# Patient Record
Sex: FEMALE | Race: WHITE | NOT HISPANIC OR LATINO | Employment: UNEMPLOYED | URBAN - METROPOLITAN AREA
[De-identification: names, ages, dates, MRNs, and addresses within clinical notes are randomized per-mention and may not be internally consistent; named-entity substitution may affect disease eponyms.]

---

## 2017-01-30 ENCOUNTER — OFFICE VISIT (OUTPATIENT)
Dept: OPHTHALMOLOGY | Facility: CLINIC | Age: 57
End: 2017-01-30
Payer: COMMERCIAL

## 2017-01-30 DIAGNOSIS — R25.3 EYELID TWITCH: Primary | ICD-10-CM

## 2017-01-30 DIAGNOSIS — Z13.5 GLAUCOMA SCREENING: ICD-10-CM

## 2017-01-30 DIAGNOSIS — H04.123 DRY EYES, BILATERAL: ICD-10-CM

## 2017-01-30 DIAGNOSIS — H52.4 PRESBYOPIA: ICD-10-CM

## 2017-01-30 PROCEDURE — 92004 COMPRE OPH EXAM NEW PT 1/>: CPT | Mod: S$GLB,,, | Performed by: OPTOMETRIST

## 2017-01-30 PROCEDURE — 99999 PR PBB SHADOW E&M-NEW PATIENT-LVL II: CPT | Mod: PBBFAC,,, | Performed by: OPTOMETRIST

## 2017-01-30 RX ORDER — RIZATRIPTAN BENZOATE 5 MG/1
5 TABLET ORAL
COMMUNITY

## 2017-01-30 RX ORDER — BUTALBITAL, ACETAMINOPHEN AND CAFFEINE 50; 325; 40 MG/1; MG/1; MG/1
1 TABLET ORAL EVERY 4 HOURS PRN
COMMUNITY

## 2017-01-30 NOTE — MR AVS SNAPSHOT
[facility]  [address] [facility] [address] [place] [address]  Phone: [phone]  Fax: [phone]                  Arlen Clause   2017 2:00 PM   Office Visit    Description:  Female : 1960   Provider:  Paris Jeff OD   Department:  [facility] - Op[facility]           Reason for Visit     Eye Exam     Headache     Eye Pain           Diagnoses this Visit        Comments    Eyelid twitch    -  Primary     Dry eyes, bilateral         Glaucoma screening         Presbyopia                To Do List           Goals (5 Years of Data)     None      Follow-Up and Disposition     Return if symptoms worsen or fail to improve.      [facility]     [facility] Care [facility] -  Assistance  Registered nurses in the [facility] provide clinical advisement, health education, appointment booking, and other advisory services.  Call for this free service at [phone].             Medications           Message regarding Medications     Verify the changes and/or additions to your medication regime listed below are the same as discussed with your clinician today.  If any of these changes or additions are incorrect, please notify your healthcare provider.             Verify that the below list of medications is an accurate representation of the medications you are currently taking.  If none reported, the list may be blank. If incorrect, please contact your healthcare provider. Carry this list with you in case of emergency.           Current Medications     butalbital-acetaminophen-caffeine -40 mg (FIORICET, ESGIC) -40 mg per tablet Take 1 tablet by mouth every 4 (four) hours as needed for Pain.    rizatriptan (MAXALT) 5 MG tablet Take 5 mg by mouth as needed for Migraine.           Clinical Reference Information           Allergies as of 2017     Macrolide Antibiotics    Mupirocin    Shellfish Containing Products    Tetracyclines      Immunizations Administered on  Date of Encounter - 1/30/2017     None      [facility] Sign-Up     Activating your [facility] account is as easy as 1-2-3!     1) Visit [email or web address], select Sign Up Now, enter this activation code and your date of birth, then select Next.  [email or web address]  Expires: 3/16/2017  5:37 PM      2) Create a username and password to use when you visit [facility] in the future and select a security question in case you lose your password and select Next.    3) Enter your e-mail address and click Sign Up!    Additional Information  If you have questions, please e-mail [email or web address] or call [phone] to talk to our [facility] staff. Remember, [facility] is NOT to be used for urgent needs. For medical emergencies, dial 911.

## 2017-01-30 NOTE — PROGRESS NOTES
HPI     Eye Exam    Additional comments: new pt           Headache    Additional comments: migraines           Eye Pain    Additional comments: twitching OD>OS x1 month. pain behind eye           Comments   Pt states that she has been here before, unable to locate a record. C/o   twitching OD>OS, began precious day. Experienced some pain behind her   eye, but has gone away. Pt has Lumigan samples, has been using for eyelash   growth, wants to make sure that it is safe for use. Using +1.50 otc   readers. Has migraines, started a new med recently.   Currently using Maxalt and Fiorcet, without much success.  Uses artificial tears as needed..       Last edited by Paris Jeff, OD on 1/30/2017  4:19 PM. (History)            Assessment /Plan     For exam results, see Encounter Report.    Eyelid twitch    Dry eyes, bilateral    Glaucoma screening    Presbyopia      Benign lid twitch.    Continue artificial tears as needed..  Glaucoma screening and fundus exam normal.  Continue over the counter readers.  Return to clinic 1 yr.

## 2017-09-15 ENCOUNTER — TELEPHONE (OUTPATIENT)
Dept: OPHTHALMOLOGY | Facility: CLINIC | Age: 57
End: 2017-09-15

## 2017-09-15 ENCOUNTER — OFFICE VISIT (OUTPATIENT)
Dept: OPHTHALMOLOGY | Facility: CLINIC | Age: 57
End: 2017-09-15
Payer: COMMERCIAL

## 2017-09-15 DIAGNOSIS — H01.025 SQUAMOUS BLEPHARITIS OF LEFT LOWER EYELID: Primary | ICD-10-CM

## 2017-09-15 PROCEDURE — 99999 PR PBB SHADOW E&M-EST. PATIENT-LVL I: CPT | Mod: PBBFAC,,, | Performed by: OPTOMETRIST

## 2017-09-15 PROCEDURE — 92012 INTRM OPH EXAM EST PATIENT: CPT | Mod: S$GLB,,, | Performed by: OPTOMETRIST

## 2017-09-15 RX ORDER — BACITRACIN ZINC AND POLYMYXIN B SULFATE 500; 10000 [USP'U]/G; [USP'U]/G
OINTMENT OPHTHALMIC
Qty: 3.5 G | Refills: 0 | Status: SHIPPED | OUTPATIENT
Start: 2017-09-15 | End: 2017-09-22

## 2017-09-15 NOTE — PROGRESS NOTES
HPI     Eye Problem    Additional comments: Swollen bottom lid OD           Comments   NP to [facility]. Patient c/o swollen bottom lid OS  Pain Scale:  6  Onset:   4 days ago  OD, OS, OU:   OS*  Discharge:   No  A.M. Matting:  No  Itch:   No  Redness:   No  Photophobia:   No  Foreign body sensation:   Yes  Deep pain:   No  Previous occurrence:   No  Drops:   Refresh       Last edited by Pau Watson, PCT on 9/15/2017 10:19 AM. (History)              Assessment /Plan     For exam results, see Encounter Report.    Squamous blepharitis of left lower eyelid    -     tobramycin sulfate 0.3% (TOBREX) 0.3 % ophthalmic ointment; Apply small amount to affected area of left lower lid tid  Dispense: 3.5 g; Refill: 0  Reassured pt not pink eye  Recommended warm soaks tid OS x 1 wk  Tobramycin da as directed    RTC PRN if symptoms worsen or if not resolved x 1 week  Discussed above and all questions were answered.

## 2017-09-15 NOTE — TELEPHONE ENCOUNTER
----- Message from ELIOT Grover sent at 9/15/2017  1:22 PM CDT -----  Contact: pt      ----- Message -----  From: Aimee Green  Sent: 9/15/2017  11:47 AM  To: Jennifer Cruz Staff    [phone] pt states tobramycin sulfate 0.3% (TOBREX) 0.3 % ophthalmic ointment is too high in cost she would like another oint called in thanks

## 2017-09-18 ENCOUNTER — OFFICE VISIT (OUTPATIENT)
Dept: OPHTHALMOLOGY | Facility: CLINIC | Age: 57
End: 2017-09-18
Payer: COMMERCIAL

## 2017-09-18 DIAGNOSIS — H00.015 HORDEOLUM EXTERNUM OF LEFT LOWER EYELID: Primary | ICD-10-CM

## 2017-09-18 PROCEDURE — 92012 INTRM OPH EXAM EST PATIENT: CPT | Mod: S$GLB,,, | Performed by: OPTOMETRIST

## 2017-09-18 PROCEDURE — 99999 PR PBB SHADOW E&M-EST. PATIENT-LVL II: CPT | Mod: PBBFAC,,, | Performed by: OPTOMETRIST

## 2017-09-18 NOTE — PROGRESS NOTES
HPI     Last DNL visit 09/15/2017  Chief complaint: Irritation lower eyelid left eye  Onset: about 6 days ago  Patient was seen by DNL on Friday and of given Tobrex ointment and states   that condition is not improving and just wants to make sure it is not   getting worse    Last edited by Zacarias Morris MA on 9/18/2017  8:30 AM. (History)            Assessment /Plan     For exam results, see Encounter Report.    Hordeolum externum of left lower eyelid      Continue Tobrex.  Add frequent  hot compresses.  Return to clinic as needed..

## 2017-09-25 ENCOUNTER — TELEPHONE (OUTPATIENT)
Dept: OPHTHALMOLOGY | Facility: CLINIC | Age: 57
End: 2017-09-25

## 2017-09-25 NOTE — TELEPHONE ENCOUNTER
Called and spoke with patient advised to use warm compresses and if condition does not improve to please give us a call

## 2017-09-25 NOTE — TELEPHONE ENCOUNTER
----- Message from Jessica Green sent at 9/25/2017  9:11 AM CDT -----  Contact: pt  The pt has a question concerning her eye, can be reached at [phone]///[id number]

## 2021-01-25 ENCOUNTER — TELEPHONE (OUTPATIENT)
Dept: ALLERGY | Facility: CLINIC | Age: 61
End: 2021-01-25

## 2021-05-17 ENCOUNTER — TELEPHONE (OUTPATIENT)
Dept: ALLERGY | Facility: CLINIC | Age: 61
End: 2021-05-17

## 2021-05-20 ENCOUNTER — OFFICE VISIT (OUTPATIENT)
Dept: ALLERGY | Facility: CLINIC | Age: 61
End: 2021-05-20
Payer: COMMERCIAL

## 2021-05-20 VITALS
SYSTOLIC BLOOD PRESSURE: 112 MMHG | DIASTOLIC BLOOD PRESSURE: 77 MMHG | WEIGHT: 125 LBS | TEMPERATURE: 97 F | BODY MASS INDEX: 22.15 KG/M2 | HEIGHT: 63 IN | HEART RATE: 73 BPM

## 2021-05-20 DIAGNOSIS — L50.9 URTICARIA: Primary | ICD-10-CM

## 2021-05-20 DIAGNOSIS — G43.909 MIGRAINE WITHOUT STATUS MIGRAINOSUS, NOT INTRACTABLE, UNSPECIFIED MIGRAINE TYPE: ICD-10-CM

## 2021-05-20 DIAGNOSIS — T61.91XD: ICD-10-CM

## 2021-05-20 DIAGNOSIS — T61.781D: ICD-10-CM

## 2021-05-20 PROBLEM — T61.781A: Status: ACTIVE | Noted: 2021-05-20

## 2021-05-20 PROBLEM — T61.91XA: Status: ACTIVE | Noted: 2021-05-20

## 2021-05-20 PROCEDURE — 99214 PR OFFICE/OUTPT VISIT, EST, LEVL IV, 30-39 MIN: ICD-10-PCS | Mod: S$GLB,,, | Performed by: SPECIALIST

## 2021-05-20 PROCEDURE — 3008F PR BODY MASS INDEX (BMI) DOCUMENTED: ICD-10-PCS | Mod: CPTII,S$GLB,, | Performed by: SPECIALIST

## 2021-05-20 PROCEDURE — 99999 PR PBB SHADOW E&M-EST. PATIENT-LVL III: CPT | Mod: PBBFAC,,, | Performed by: SPECIALIST

## 2021-05-20 PROCEDURE — 99214 OFFICE O/P EST MOD 30 MIN: CPT | Mod: S$GLB,,, | Performed by: SPECIALIST

## 2021-05-20 PROCEDURE — 3008F BODY MASS INDEX DOCD: CPT | Mod: CPTII,S$GLB,, | Performed by: SPECIALIST

## 2021-05-20 PROCEDURE — 99999 PR PBB SHADOW E&M-EST. PATIENT-LVL III: ICD-10-PCS | Mod: PBBFAC,,, | Performed by: SPECIALIST

## 2021-05-20 RX ORDER — ONABOTULINUMTOXINA 100 [USP'U]/1
INJECTION, POWDER, LYOPHILIZED, FOR SOLUTION INTRADERMAL; INTRAMUSCULAR
COMMUNITY

## 2021-05-20 RX ORDER — EPINEPHRINE 0.3 MG/.3ML
1 INJECTION SUBCUTANEOUS ONCE
Qty: 4 DEVICE | Refills: 2 | Status: SHIPPED | OUTPATIENT
Start: 2021-05-20 | End: 2021-05-20

## 2021-05-20 RX ORDER — ALPRAZOLAM 0.25 MG/1
TABLET ORAL
COMMUNITY
Start: 2017-06-28

## 2021-05-20 RX ORDER — PREDNISONE 20 MG/1
20 TABLET ORAL DAILY PRN
Qty: 20 TABLET | Refills: 0 | Status: SHIPPED | OUTPATIENT
Start: 2021-05-20 | End: 2021-05-25

## 2021-05-20 RX ORDER — ATORVASTATIN CALCIUM 10 MG/1
10 TABLET, FILM COATED ORAL
COMMUNITY
Start: 2021-03-22 | End: 2024-02-15

## 2021-05-20 RX ORDER — FLUOXETINE 10 MG/1
10 TABLET ORAL
COMMUNITY
Start: 2017-09-15

## 2022-02-15 NOTE — PROGRESS NOTES
Subjective:       Patient ID: Arlen Baker is a 61 y.o. female.    Chief Complaint:    Follow up on Dermatographic and allergic urticaria and food/ shell fish allergy-- history.      HPI:      Had COVID in January 24- 29 th. Was RX with antibody cocktail-- relieved uneventfully.  Not yet eaten Clam- EATS ALL OTHER SHELL FISH.    OM Q 3 monts BOTOX injection for MIGRAINE- Helping a lot. Not had eye check up in years.  Last seen on 05- 20- 2021-- with acute generalized urticaria. The hives were treated with Benadryl, prednisone, Xyzal and Singulair..  She is very dermatographic. Because of that she can get spontaneous hives with minimal provocation.    There is a history of having large local reactions after insect and bug bites.    She had a history of allergies / anaphylaxis after eating shell fish. I did skin prick tests and Ig E RAST and orally challenged with all shell fish. She was not willing to be challenged with clam and is still not eating oil.    Has hyper cholesterolemia. Has a long standing history of migraine.    There is a history of allergies to common inhalant aero allergens.    Mupirocin caused itch, redness and swelling.  Drug allergies include macrolides and tetracyclines.  Has a family history of allergies.             Macrolide antibiotics, Mupirocin, Shellfish containing products, and Tetracyclines     Past Medical History:   Diagnosis Date    Allergy     Migraines        No family history on file.    Environmental History: Dust Mite Controls: Dust mite controls are already in place.     Review of Systems   Constitutional: Negative.  Negative for fatigue and fever.   HENT: Positive for congestion, postnasal drip and rhinorrhea. Negative for ear pain, sinus pressure, sinus pain, sneezing and sore throat.    Eyes: Negative.  Negative for redness and itching.   Respiratory: Negative.  Negative for cough, chest tightness, shortness of breath and wheezing.    Cardiovascular: Negative.  Negative  for chest pain.   Gastrointestinal: Negative.  Negative for nausea.   Endocrine: Negative.  Negative for cold intolerance.   Genitourinary: Negative.  Negative for frequency.   Musculoskeletal: Negative.  Negative for myalgias.   Skin: Negative.  Negative for rash.   Allergic/Immunologic: Positive for environmental allergies and food allergies. Negative for immunocompromised state.   Neurological: Positive for headaches. Negative for dizziness.   Hematological: Negative.  Negative for adenopathy.   Psychiatric/Behavioral: Negative.  Negative for sleep disturbance.       Objective:     There were no vitals taken for this visit.    Physical Exam  Vitals and nursing note reviewed.   Constitutional:       Appearance: Normal appearance. She is normal weight.   HENT:      Head: Normocephalic and atraumatic.      Right Ear: Tympanic membrane, ear canal and external ear normal.      Left Ear: Tympanic membrane, ear canal and external ear normal.      Nose: Congestion and rhinorrhea present.      Mouth/Throat:      Mouth: Mucous membranes are moist.      Pharynx: Oropharynx is clear.   Eyes:      Extraocular Movements: Extraocular movements intact.      Conjunctiva/sclera: Conjunctivae normal.      Pupils: Pupils are equal, round, and reactive to light.   Cardiovascular:      Rate and Rhythm: Normal rate and regular rhythm.      Pulses: Normal pulses.      Heart sounds: Normal heart sounds.   Pulmonary:      Effort: Pulmonary effort is normal.   Abdominal:      General: Abdomen is flat. Bowel sounds are normal.      Palpations: Abdomen is soft.   Musculoskeletal:         General: Normal range of motion.      Cervical back: Normal range of motion and neck supple.   Skin:     General: Skin is warm and dry.      Capillary Refill: Capillary refill takes less than 2 seconds.   Neurological:      General: No focal deficit present.      Mental Status: She is alert and oriented to person, place, and time. Mental status is at  baseline.   Psychiatric:         Mood and Affect: Mood normal.         Behavior: Behavior normal.         Thought Content: Thought content normal.         Judgment: Judgment normal.           Assessment:      1. Urticaria due to food allergy    2. Dermatographic urticaria    3. Migraine without status migrainosus, not intractable, unspecified migraine type    4       Shell fish allergy  5       Drug allergy-- Macrolides, tetracycline and Mupirocin.  6        Contact dermatitis to Bactroban  7        Recovered from COVID-- Jan 24- 29 th 2022..    Plan:       Was skin tested, confirmatory IgE Immuno CAP done and underwent oral provocation challenge with various shell fish on different days,  On separate days-- successfully.  Since clam was the only shell fish she was not orally challenged with, I encourage her tp do it.  Xyzal 5 mg qd.  Singulair 10 mg qd , if hives recur  For allergic emergency-- Epipen or AUVI- Q 0.30 mg IM stat  BOTOX INJECTIONS Q 3 MONTHS  Identify and avoid migraine triggers  Ophthalmology referral with Timothy Andrade MD RECOMMENDED.  Recommend oral provocation challenge with CLAM CHOWDER  Avoid scratching and inducing trauma to the skin  Maxalt prn for migraine  Lipitor 40 mg qd  Follow up in 10 months                  Problems Address                                                 Amount and/or Complexity                                                                      Risk       3           [] 2 or more self-limited or minor problems                      [] Limited                                                                        [] Low                  [] 1 stable chronic illness                                                  Any combination of the two                                               OTC drugs                  []Acute, uncomplicated illness or injury                            Review of prior external notes from unique source           Minor surgery with no risk  factors                                                                                                               [] 1 []2  []3+                                                                                                              Review of results from each unique test                                                                                                               [] 1 []2  [] 3+                                                                                                              Order of each unique test                                                                                                               [] 1 []2  [] 3+                                                                                                              Or                                                                                                             [] Assessment requiring an independent historian      4            [x] One or more chronic illness with exacerbation,              [x] Moderate                                                                      [x] Moderate                 Progression, or side effects of treatment                            -test documents or independent historians                        Prescription drug management                [x]  2 or more sable chronic illnesses                                    [] Independent interpretation of tests                              Minor surgery with identifiable risk                [] 1 undiagnosed new problem with uncertain prognosis    [] Discussion or management of test results                    elective major surgery                [] 1 acute illness with                systemic symptoms                                                                                                                                                              [] 1 acute complicated injury                                                                                                                                           Elective major surgery                                                                                                                                                                                                                                                                                                                                                                                                  5            [] 1 or more chronic illnesses with severe exacerbation,     [] Extensive(two from below)                                         [] High                                                                                                               [] Independent interpretation of results                         Drug therapy requiring intensive                                                                                                               []Discussion of management or test interpretation           monitoring                                                                                                                                                                                                       Decision to de-escalate care                 [] 1 acute or chronic illness or injury that poses a threat                                                                                               Decision regarding hospitalization

## 2022-02-16 ENCOUNTER — OFFICE VISIT (OUTPATIENT)
Dept: ALLERGY | Facility: CLINIC | Age: 62
End: 2022-02-16
Payer: COMMERCIAL

## 2022-02-16 VITALS
WEIGHT: 141.56 LBS | HEIGHT: 63 IN | HEART RATE: 76 BPM | TEMPERATURE: 96 F | BODY MASS INDEX: 25.08 KG/M2 | DIASTOLIC BLOOD PRESSURE: 78 MMHG | SYSTOLIC BLOOD PRESSURE: 125 MMHG

## 2022-02-16 DIAGNOSIS — L50.0 URTICARIA DUE TO FOOD ALLERGY: Primary | ICD-10-CM

## 2022-02-16 DIAGNOSIS — L50.3 DERMATOGRAPHIC URTICARIA: ICD-10-CM

## 2022-02-16 DIAGNOSIS — G43.909 MIGRAINE WITHOUT STATUS MIGRAINOSUS, NOT INTRACTABLE, UNSPECIFIED MIGRAINE TYPE: ICD-10-CM

## 2022-02-16 PROCEDURE — 99214 OFFICE O/P EST MOD 30 MIN: CPT | Mod: S$GLB,,, | Performed by: SPECIALIST

## 2022-02-16 PROCEDURE — 3078F PR MOST RECENT DIASTOLIC BLOOD PRESSURE < 80 MM HG: ICD-10-PCS | Mod: CPTII,S$GLB,, | Performed by: SPECIALIST

## 2022-02-16 PROCEDURE — 99214 PR OFFICE/OUTPT VISIT, EST, LEVL IV, 30-39 MIN: ICD-10-PCS | Mod: S$GLB,,, | Performed by: SPECIALIST

## 2022-02-16 PROCEDURE — 1160F PR REVIEW ALL MEDS BY PRESCRIBER/CLIN PHARMACIST DOCUMENTED: ICD-10-PCS | Mod: CPTII,S$GLB,, | Performed by: SPECIALIST

## 2022-02-16 PROCEDURE — 3074F SYST BP LT 130 MM HG: CPT | Mod: CPTII,S$GLB,, | Performed by: SPECIALIST

## 2022-02-16 PROCEDURE — 3008F BODY MASS INDEX DOCD: CPT | Mod: CPTII,S$GLB,, | Performed by: SPECIALIST

## 2022-02-16 PROCEDURE — 1159F MED LIST DOCD IN RCRD: CPT | Mod: CPTII,S$GLB,, | Performed by: SPECIALIST

## 2022-02-16 PROCEDURE — 3008F PR BODY MASS INDEX (BMI) DOCUMENTED: ICD-10-PCS | Mod: CPTII,S$GLB,, | Performed by: SPECIALIST

## 2022-02-16 PROCEDURE — 3074F PR MOST RECENT SYSTOLIC BLOOD PRESSURE < 130 MM HG: ICD-10-PCS | Mod: CPTII,S$GLB,, | Performed by: SPECIALIST

## 2022-02-16 PROCEDURE — 1160F RVW MEDS BY RX/DR IN RCRD: CPT | Mod: CPTII,S$GLB,, | Performed by: SPECIALIST

## 2022-02-16 PROCEDURE — 99999 PR PBB SHADOW E&M-EST. PATIENT-LVL III: CPT | Mod: PBBFAC,,, | Performed by: SPECIALIST

## 2022-02-16 PROCEDURE — 99999 PR PBB SHADOW E&M-EST. PATIENT-LVL III: ICD-10-PCS | Mod: PBBFAC,,, | Performed by: SPECIALIST

## 2022-02-16 PROCEDURE — 1159F PR MEDICATION LIST DOCUMENTED IN MEDICAL RECORD: ICD-10-PCS | Mod: CPTII,S$GLB,, | Performed by: SPECIALIST

## 2022-02-16 PROCEDURE — 3078F DIAST BP <80 MM HG: CPT | Mod: CPTII,S$GLB,, | Performed by: SPECIALIST

## 2022-09-21 ENCOUNTER — TELEPHONE (OUTPATIENT)
Dept: ALLERGY | Facility: CLINIC | Age: 62
End: 2022-09-21
Payer: COMMERCIAL

## 2022-09-21 DIAGNOSIS — L50.0 URTICARIA DUE TO FOOD ALLERGY: Primary | ICD-10-CM

## 2022-09-21 RX ORDER — EPINEPHRINE 0.3 MG/.3ML
1 INJECTION SUBCUTANEOUS ONCE
Qty: 2 EACH | Refills: 2 | Status: SHIPPED | OUTPATIENT
Start: 2022-09-21 | End: 2024-02-15

## 2022-09-21 NOTE — TELEPHONE ENCOUNTER
----- Message from Gabby Pope LPN sent at 9/16/2022  2:22 PM CDT -----  Spoke with patient, she is requesting 2 boxes of epi pen to be sent to pharmacy please.  ----- Message -----  From: Joya Alvarado  Sent: 9/16/2022  10:52 AM CDT  To: Vianney Rucker Staff    Pt called in re: Epi pen please call pt @ .[phone]        Thanks [facility]

## 2022-11-07 PROBLEM — M81.0 OSTEOPOROSIS, POST-MENOPAUSAL: Status: ACTIVE | Noted: 2019-05-08

## 2022-11-07 PROBLEM — I34.1 MVP (MITRAL VALVE PROLAPSE): Status: ACTIVE | Noted: 2022-11-07

## 2024-01-25 ENCOUNTER — TELEPHONE (OUTPATIENT)
Dept: ALLERGY | Facility: CLINIC | Age: 64
End: 2024-01-25
Payer: COMMERCIAL

## 2024-01-25 NOTE — TELEPHONE ENCOUNTER
----- Message from Paige Rivera sent at 1/25/2024  4:31 PM CST -----  Contact: Arlen Mckinley is calling to speak to the nurse regarding scheduling a appointment for her epi pen renewal, please give her a call at [phone]    Thanks  LJ

## 2024-02-29 ENCOUNTER — OFFICE VISIT (OUTPATIENT)
Dept: ALLERGY | Facility: CLINIC | Age: 64
End: 2024-02-29
Payer: MEDICARE

## 2024-02-29 VITALS
TEMPERATURE: 98 F | HEART RATE: 86 BPM | BODY MASS INDEX: 24.76 KG/M2 | WEIGHT: 139.75 LBS | SYSTOLIC BLOOD PRESSURE: 132 MMHG | DIASTOLIC BLOOD PRESSURE: 83 MMHG

## 2024-02-29 DIAGNOSIS — L50.0 URTICARIA DUE TO FOOD ALLERGY: Primary | ICD-10-CM

## 2024-02-29 DIAGNOSIS — L50.3 DERMATOGRAPHIC URTICARIA: ICD-10-CM

## 2024-02-29 DIAGNOSIS — Z88.9 MULTIPLE DRUG ALLERGIES: ICD-10-CM

## 2024-02-29 DIAGNOSIS — G43.109 MIGRAINE WITH AURA AND WITHOUT STATUS MIGRAINOSUS, NOT INTRACTABLE: ICD-10-CM

## 2024-02-29 PROCEDURE — 99213 OFFICE O/P EST LOW 20 MIN: CPT | Mod: PBBFAC | Performed by: SPECIALIST

## 2024-02-29 PROCEDURE — 99999 PR PBB SHADOW E&M-EST. PATIENT-LVL III: CPT | Mod: PBBFAC,,, | Performed by: SPECIALIST

## 2024-02-29 PROCEDURE — 99417 PROLNG OP E/M EACH 15 MIN: CPT | Mod: S$PBB,,, | Performed by: SPECIALIST

## 2024-02-29 PROCEDURE — 99215 OFFICE O/P EST HI 40 MIN: CPT | Mod: S$PBB,,, | Performed by: SPECIALIST

## 2024-02-29 RX ORDER — EPINEPHRINE 0.3 MG/.3ML
1 INJECTION SUBCUTANEOUS ONCE
Qty: 4 EACH | Refills: 1 | Status: SHIPPED | OUTPATIENT
Start: 2024-02-29 | End: 2024-02-29

## 2024-02-29 NOTE — PROGRESS NOTES
Subjective:       Patient ID: Larry Baker is a 63 y.o. female.    Chief Complaint:    FOLLOW UP ON DERMATOGRAPHIC URTICARIA, ALLERGIC URTICARIA-- No longer has shell fish allergy    HPI:   female, 63 year old,  with the above complaints. DOING WELL OVERALL. HAS YEAR ROUND NASAL ALLERGIES.   NO longer has shellfish allergies. She eats all shellfish without any symptoms.  Can not tolerate alcohol- even one drink of Daiquri or wine.  LARRY IS EXTREMELY DERMOGRAPHIC. sHE WAS ADVISED TO AVOID SCRATCHING AND INDUCING TRAUMA TO THE SKIN.  SHE GETS LARGE LOCAL REACTIONS AFTER INSECT BITES.  SHE HAS NOT HAD ANY EPISODES OF ANAPHYLAXIS IN THE LAST 5 YEARS-- However she wants to have Epipen available , just in case.  Remote allergy testing revealed allergies to common aero allergens.  mUPIROCIN CAUSED NASAL ITCH AND REDNESS AND SWELLING.  SHE CAN NOT TAKE MACROLIDES AND TETRACYCLINES.  SHE HAS A LONG STANDING HISTORY OF MIGRAINES.  I HAVE ADVISED her to consult ENT Dr. Orville Rene for ears pressure.  Must follow up with her cardiologist for tachycardia and  heart rhythm issues- May need Holter- Must rule out A fib.         Macrolide antibiotics, Mupirocin, Shellfish containing products, and Tetracyclines -- allergies were reported    Past Medical History:   Diagnosis Date    Allergy     Migraines     MVP (mitral valve prolapse)     Osteoporosis        Family History   Problem Relation Age of Onset    Diabetes Mother     Diabetes Maternal Grandmother        Environmental History: Dust Mite Controls: Dust mite controls are not in place.     Review of Systems   Constitutional: Negative.    HENT:  Positive for congestion and postnasal drip.    Eyes: Negative.    Respiratory: Negative.     Cardiovascular: Negative.    Gastrointestinal: Negative.    Endocrine: Negative.    Genitourinary: Negative.    Musculoskeletal: Negative.    Skin: Negative.    Allergic/Immunologic: Negative.    Neurological: Negative.   "  Hematological: Negative.    Psychiatric/Behavioral: Negative.       Objective:     There were no vitals taken for this visit.    Physical Exam  Vitals and nursing note reviewed. Exam conducted with a chaperone present.   Constitutional:       Appearance: Normal appearance. She is normal weight.   HENT:      Head: Normocephalic and atraumatic.      Right Ear: Tympanic membrane, ear canal and external ear normal.      Left Ear: Tympanic membrane, ear canal and external ear normal.      Nose: Congestion present.      Mouth/Throat:      Mouth: Mucous membranes are moist.      Pharynx: Oropharynx is clear.   Eyes:      Extraocular Movements: Extraocular movements intact.      Conjunctiva/sclera: Conjunctivae normal.      Pupils: Pupils are equal, round, and reactive to light.   Cardiovascular:      Rate and Rhythm: Regular rhythm. Tachycardia present.   Pulmonary:      Effort: Pulmonary effort is normal.      Breath sounds: Normal breath sounds.   Abdominal:      General: Abdomen is flat. Bowel sounds are normal.      Palpations: Abdomen is soft.   Musculoskeletal:         General: Normal range of motion.      Cervical back: Normal range of motion and neck supple.   Skin:     General: Skin is warm and dry.      Capillary Refill: Capillary refill takes less than 2 seconds.   Neurological:      General: No focal deficit present.      Mental Status: She is alert and oriented to person, place, and time. Mental status is at baseline.   Psychiatric:         Mood and Affect: Mood normal.         Behavior: Behavior normal.         Thought Content: Thought content normal.         Judgment: Judgment normal.         Assessment:      1. Urticaria due to food allergy    2. Dermatographic urticaria    3. Migraine with aura and without status migrainosus, not intractable    4. Multiple drug allergies    5       Contact dermatitis  to mupirocin  6        " Allergy to macrolides, tetracycline and mupirocin '  7        Pulsatle " bilateral ears     Plan:     SUCCESSFULLY DID ORAL PROVOCATION CHALLENGE WITH  SHELLFISH.  ----------------------------------------------------------------------------------------------------------------  As needed Epipen 1: 1000-- 0.30 mg IM stat.    XYZAL 5 mg qd PRN  Singulair 10 mg.PRN  Botox injections for migraine.  Maxalt prn for acute episodes of migraine..  Lipitor 40 mg qd Avoid scratch and trauma to the skin- lukewarm showers or bath-- dermographic,\.  CARDIOLOGY CONSULT-- Louis joyce MD- may need EKG and Holter monitoring- would like to rule out A fib.  Consult ENT Orville Francis MD about throbbing ears.    Yearly follow ups                  Problems Address                                                 Amount and/or Complexity                                                                      Risk       3           [] 2 or more self-limited or minor problems                      [] Limited                                                                        [] Low                  [] 1 stable chronic illness                                                  Any combination of the two                                               OTC drugs                  []Acute, uncomplicated illness or injury                            Review of prior external notes from unique source           Minor surgery with no risk factors                                                                                                               [] 1 []2  []3+                                                                                                              Review of results from each unique test                                                                                                               [] 1 []2  [] 3+                                                                                                              Order of each unique test                                                                                                                [] 1 []2  [] 3+                                                                                                              Or                                                                                                             [] Assessment requiring an independent historian      4            [] One or more chronic illness with exacerbation,              [] Moderate                                                                      [] Moderate                 Progression, or side effects of treatment                            -test documents or independent historians                        Prescription drug management                []  2 or more sable chronic illnesses                                    [] Independent interpretation of tests                              Minor surgery with identifiable risk                [] 1 undiagnosed new problem with uncertain prognosis    [] Discussion or management of test results                    elective major surgery                [] 1 acute illness with                systemic symptoms                                                                                                                                                              [] 1 acute complicated injury                                                                                                                                          Elective major surgery                                                                                                                                                                                                                                                                                                                                                                                                  5            [] 1 or more chronic illnesses with severe exacerbation,     [] Extensive(two from  below)                                         [] High                                                                                                               [] Independent interpretation of results                         Drug therapy requiring intensive                                                                                                               []Discussion of management or test interpretation           monitoring                                                                                                                                                                                                       Decision to de-escalate care                 [] 1 acute or chronic illness or injury that poses a threat                                                                                               Decision regarding hospitalization

## 2024-08-14 ENCOUNTER — TELEPHONE (OUTPATIENT)
Dept: ALLERGY | Facility: CLINIC | Age: 64
End: 2024-08-14
Payer: COMMERCIAL

## 2024-08-14 NOTE — TELEPHONE ENCOUNTER
Rash since Friday (not hives per pt). Started new medication for Graves Disease two weeks ago (Methimazole). Rash is generalized, no angioedema, very itchy. Went to Urgent Care on Sat where she received steroid injection and Hydroxyzine tid. Also taking Claritin daily. Currently at Dr Michaels's office who recommended she follow up with Dr Faith.  Per Dr Faith, ok to work in for tomorrow morning.

## 2024-08-14 NOTE — PROGRESS NOTES
Subjective:       Patient ID: Arlen Baker is a 63 y.o. female.    Chief Complaint:  '  New onset skin rash after being on thyroid medication.  Has been having Dermatographic Urticaria.    HPI:     FEMALE 63 YEAR OLD, accompanied by her .  New complaint is generalized urticaria --( without angioedema )  since 08- 13- 2024. The patient is concerned that the hives may be due to Methimazole 10 mg she  Was started on 07- 28- 2024- to treat hyperthyroidism by her endocrinologist Suresh walker MD .. Hives last 24 -36 hours. Five days ago she was seen at an urgent care center for urticaria- and was treated with a Celestone injection, Vistaril 25 mg tid . She took one 20 mg prednisone that made her anxious and caused palpitation  The hives are prersistant even after discontinuing Methimazole.  --------------------------------------------------------------------------------------------  She has MODERATE DERMATOGRAPHIA OVER THE PAST 5 plus years  --------------------------------------------------------------    She has an appointment to follow up with Elias Moreno MD , Dermatologist today. He has been treating her with Fluconazole one 200 mg pill weekly in the past several months for feet fungal infection. I DOUBT THE INFECTION OR ANTIFUNGAL TREATMENT are responsible for urticaria.  ---------------------------------------------------------------------------------------------------------------------------------------------------------------------------------------------------------  The patient , in the past had hives  / angioedema - suspected to have caused after eating shell fish. I had tested her by Immun[facility] to shell fish, which were negative. I orally challenged her with various shellfish to establish that she is not allergic to shell fish.  ----------------------------------------------------------------------------------------------------------------------------  She is an anxious person. Also  worried about her elderly father with medical issues.    Never vaped or smoked cigarettes. 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       Macrolide antibiotics, Mupirocin, Shellfish containing products, and Tetracyclines - allergies were reported    Past Medical History:   Diagnosis Date    Allergy     Migraines     MVP (mitral valve prolapse)     Osteoporosis        Family History   Problem Relation Name Age of Onset    Diabetes Mother      Diabetes Maternal Grandmother         Environmental History: Dust Mite Controls: Dust mite controls are already in place.     Review of Systems   Constitutional: Negative.  Negative for fatigue and fever.   HENT: Negative.  Negative for congestion, ear pain, postnasal drip, rhinorrhea, sinus pressure, sinus pain, sneezing and sore throat.    Eyes: Negative.  Negative for redness and itching.   Respiratory: Negative.  Negative for cough, chest tightness, shortness of breath and wheezing.    Cardiovascular: Negative.  Negative for chest pain.   Gastrointestinal: Negative.  Negative for nausea.   Endocrine: Negative.  Negative for cold intolerance.   Genitourinary: Negative.  Negative for frequency.   Musculoskeletal: Negative.  Negative for myalgias.   Skin:  Negative for rash.        Urticaria - generalized since 08- 03- 2024. Moderate dermatographia   Allergic/Immunologic: Negative.  Negative for environmental allergies, food allergies and immunocompromised state.   Neurological: Negative.  Negative for dizziness and headaches.   Hematological: Negative.  Negative for adenopathy.   Psychiatric/Behavioral: Negative.  Negative for sleep disturbance.      Objective:     There were no vitals taken for this visit.    Physical Exam  Vitals and nursing note reviewed. Exam conducted with a chaperone present.   Constitutional:       Appearance: Normal appearance. She is normal weight.   HENT:      Head: Normocephalic and atraumatic.      Right Ear: Tympanic membrane, ear canal and external  "ear normal.      Left Ear: Tympanic membrane, ear canal and external ear normal.      Nose: Congestion present.      Mouth/Throat:      Mouth: Mucous membranes are moist.      Pharynx: Oropharynx is clear.   Eyes:      Extraocular Movements: Extraocular movements intact.      Conjunctiva/sclera: Conjunctivae normal.      Pupils: Pupils are equal, round, and reactive to light.   Cardiovascular:      Rate and Rhythm: Normal rate and regular rhythm.      Pulses: Normal pulses.      Heart sounds: Normal heart sounds.   Pulmonary:      Effort: Pulmonary effort is normal.      Breath sounds: Normal breath sounds.   Abdominal:      General: Abdomen is flat. Bowel sounds are normal.      Palpations: Abdomen is soft.   Musculoskeletal:         General: Normal range of motion.      Cervical back: Normal range of motion and neck supple.   Skin:     General: Skin is warm and dry.      Capillary Refill: Capillary refill takes less than 2 seconds.      Comments: Urticaria- - left cubital fossa, trunl, bilateral thigs, legs and anterior abdominal wall- red, raised and itchy.   Neurological:      General: No focal deficit present.      Mental Status: She is alert and oriented to person, place, and time. Mental status is at baseline.   Psychiatric:         Mood and Affect: Mood normal.         Behavior: Behavior normal.         Thought Content: Thought content normal.         Judgment: Judgment normal.       Assessment:      1. Dermatographic urticaria    2. Dry skin dermatitis    3. Urticaria due to food allergy    4. Migraine without status migrainosus, not intractable, unspecified migraine type    5. Multiple drug allergies    6. Pulsatile tinnitus of both ears    7      MUPIROCIN- CONTACT DERMATITIS  8      " Allergy to Macrolides, tetracyclines and mupirocin "  9       ACUTE GENERALIZED URTICARIA  SINCE 08- 13- 2024.      Plan:     START BACK ON METHIMAZOLE 10 MG.- Follow up with Suresh Michaels MD, " Endocrinologist  ------------------------------------------------------------  Allegra 180 mg - 2 in the morning.  Zyrtec 10 mg-- 2 tablets  PM or Vistaril 50 mg at nights.  Singulair 10 mg  If urticaria worsens- consider starting her on XOLAIR 300 mg q 4 weeks,  Maxalt prn for acute episodes of migraine.  Botox injections q 3 months for migraine prophylaxis..  Follow up with PCP, endocrinologist Suresh Michaels MD , Louis Tyson MD, CARDIOLOGIST AND FOR PULSATILE EARS, follow up with    Otolaryngologist Orville Rene MD  [facility].  Has Epipen  1: 1000-- 0.30 mg IM stat for allergic emergency  Excessive sweating and exercise might flare up hives  FOLLOW UP AS SCHEDULED                  Problems Address                                                 Amount and/or Complexity                                                                      Risk       3           [] 2 or more self-limited or minor problems                      [] Limited                                                                        [] Low                  [] 1 stable chronic illness                                                  Any combination of the two                                               OTC drugs                  []Acute, uncomplicated illness or injury                            Review of prior external notes from unique source           Minor surgery with no risk factors                                                                                                               [] 1 []2  []3+                                                                                                              Review of results from each unique test                                                                                                               [] 1 []2  [] 3+                                                                                                              Order of each unique test                                                                                                                [] 1 []2  [] 3+                                                                                                              Or                                                                                                             [] Assessment requiring an independent historian      4            [] One or more chronic illness with exacerbation,              [] Moderate                                                                      [] Moderate                 Progression, or side effects of treatment                            -test documents or independent historians                        Prescription drug management                []  2 or more sable chronic illnesses                                    [] Independent interpretation of tests                              Minor surgery with identifiable risk                [] 1 undiagnosed new problem with uncertain prognosis    [] Discussion or management of test results                    elective major surgery                [] 1 acute illness with                systemic symptoms                                                                                                                                                              [] 1 acute complicated injury                                                                                                                                          Elective major surgery                                                                                                                                                                                                                                                                                                                                                                                                  5            [] 1 or more chronic illnesses with  severe exacerbation,     [] Extensive(two from below)                                         [] High                                                                                                               [] Independent interpretation of results                         Drug therapy requiring intensive                                                                                                               []Discussion of management or test interpretation           monitoring                                                                                                                                                                                                       Decision to de-escalate care                 [] 1 acute or chronic illness or injury that poses a threat                                                                                               Decision regarding hospitalization

## 2024-08-14 NOTE — TELEPHONE ENCOUNTER
----- Message from Anais Diaz sent at 8/14/2024  3:48 PM CDT -----  Type : Patient Call      Who Called : Patient       Does the patient know what this is regarding?: Pt was recent prescribed some medication from an outside provider; pt feels she's having an allergic reaction from that medication ; pt would liek to schedule an apt for tomorrow 8/15; please advise        Would the patient rather a call back or a response via [facility]? Call        Best Call Back Number: [phone]      Additional Information:

## 2024-08-15 ENCOUNTER — OFFICE VISIT (OUTPATIENT)
Dept: ALLERGY | Facility: CLINIC | Age: 64
End: 2024-08-15
Payer: MEDICARE

## 2024-08-15 VITALS
BODY MASS INDEX: 23.82 KG/M2 | TEMPERATURE: 98 F | WEIGHT: 134.5 LBS | SYSTOLIC BLOOD PRESSURE: 133 MMHG | HEART RATE: 82 BPM | DIASTOLIC BLOOD PRESSURE: 86 MMHG

## 2024-08-15 DIAGNOSIS — Z88.9 MULTIPLE DRUG ALLERGIES: ICD-10-CM

## 2024-08-15 DIAGNOSIS — H93.A3 PULSATILE TINNITUS OF BOTH EARS: ICD-10-CM

## 2024-08-15 DIAGNOSIS — L50.3 DERMATOGRAPHIC URTICARIA: Primary | ICD-10-CM

## 2024-08-15 DIAGNOSIS — L85.3 DRY SKIN DERMATITIS: ICD-10-CM

## 2024-08-15 DIAGNOSIS — L50.0 URTICARIA DUE TO FOOD ALLERGY: ICD-10-CM

## 2024-08-15 DIAGNOSIS — G43.909 MIGRAINE WITHOUT STATUS MIGRAINOSUS, NOT INTRACTABLE, UNSPECIFIED MIGRAINE TYPE: ICD-10-CM

## 2024-08-15 PROCEDURE — 99215 OFFICE O/P EST HI 40 MIN: CPT | Mod: S$PBB,,, | Performed by: SPECIALIST

## 2024-08-15 PROCEDURE — 99999 PR PBB SHADOW E&M-EST. PATIENT-LVL IV: CPT | Mod: PBBFAC,,, | Performed by: SPECIALIST

## 2024-08-15 PROCEDURE — 99214 OFFICE O/P EST MOD 30 MIN: CPT | Mod: PBBFAC | Performed by: SPECIALIST

## 2024-08-15 RX ORDER — MONTELUKAST SODIUM 10 MG/1
10 TABLET ORAL DAILY
Qty: 30 TABLET | Refills: 3 | Status: SHIPPED | OUTPATIENT
Start: 2024-08-15 | End: 2024-09-14

## 2024-08-15 RX ORDER — METHIMAZOLE 10 MG/1
5 TABLET ORAL 3 TIMES DAILY
COMMUNITY

## 2024-08-15 RX ORDER — HYDROXYZINE HYDROCHLORIDE 25 MG/1
25 TABLET, FILM COATED ORAL 3 TIMES DAILY PRN
COMMUNITY

## 2024-08-15 RX ORDER — FLUCONAZOLE 200 MG/1
100 TABLET ORAL DAILY
COMMUNITY

## 2025-04-29 ENCOUNTER — TELEPHONE (OUTPATIENT)
Dept: ALLERGY | Facility: CLINIC | Age: 65
End: 2025-04-29
Payer: COMMERCIAL

## 2025-04-29 NOTE — TELEPHONE ENCOUNTER
----- Message from Reema sent at 4/29/2025 10:52 AM CDT -----  Contact: Arlen  .Patient is calling to speak with the nurse regarding appt  . Reports needing to come in for annual appt and update medication . Please give patient a call back at .[phone]

## 2025-04-29 NOTE — TELEPHONE ENCOUNTER
Spoke with patient and she agreed to come in on 2025 @4:30pm. She also stated that her epipen has  and needs refill. Sent message to Dr. Faith for refill

## 2025-04-30 DIAGNOSIS — L50.0 URTICARIA DUE TO FOOD ALLERGY: ICD-10-CM

## 2025-04-30 RX ORDER — EPINEPHRINE 0.3 MG/.3ML
1 INJECTION SUBCUTANEOUS ONCE
Qty: 4 EACH | Refills: 1 | Status: SHIPPED | OUTPATIENT
Start: 2025-04-30 | End: 2025-04-30

## 2025-04-30 NOTE — TELEPHONE ENCOUNTER
----- Message from Chaim Faith MD sent at 2025  2:53 PM CDT -----  Regarding: RE: Epipen Refill  Epipen 0.30 mgDisp # 2 units- and refill ONE  ----- Message -----  From: Taryn Ugalde MA  Sent: 2025  11:38 AM CDT  To: Chaim Faith MD  Subject: Epipen Refill                                    Hel Dr. Faith, Patient last appointment was 2024 and she needs refill on her epipen script. It  in 2025. Her yearly appointment is scheduled for 2025 @4:30pm

## 2025-05-01 ENCOUNTER — TELEPHONE (OUTPATIENT)
Dept: ALLERGY | Facility: CLINIC | Age: 65
End: 2025-05-01
Payer: COMMERCIAL

## 2025-05-01 DIAGNOSIS — L50.0 URTICARIA DUE TO FOOD ALLERGY: Primary | ICD-10-CM

## 2025-05-01 RX ORDER — EPINEPHRINE 0.3 MG/.3ML
1 INJECTION SUBCUTANEOUS ONCE
Qty: 2 EACH | Refills: 1 | Status: SHIPPED | OUTPATIENT
Start: 2025-05-01 | End: 2025-05-01

## 2025-05-01 NOTE — TELEPHONE ENCOUNTER
----- Message from Med Assistant Taryn sent at 2025 11:35 AM CDT -----  Regarding: Epipen Refill  Helluis manuel Faith, Patient last appointment was 2024 and she needs refill on her epipen script. It  in 2025. Her yearly appointment is scheduled for 2025 @4:30pm

## 2025-05-01 NOTE — TELEPHONE ENCOUNTER
----- Message from Chaim Faith MD sent at 2025  3:34 PM CDT -----  Regarding: RE: Epipen Refill  I filled her Epipen prescription already. Please let her know.Thank you.  ----- Message -----  From: Taryn Ugalde MA  Sent: 2025  11:38 AM CDT  To: Chaim Faith MD  Subject: Epipen Refill                                    Hel Dr. Faith, Patient last appointment was 2024 and she needs refill on her epipen script. It  in 2025. Her yearly appointment is scheduled for 2025 @4:30pm

## 2025-05-13 PROBLEM — H52.7 REFRACTIVE ERRORS: Status: ACTIVE | Noted: 2025-05-13

## 2025-06-04 ENCOUNTER — OFFICE VISIT (OUTPATIENT)
Dept: ALLERGY | Facility: CLINIC | Age: 65
End: 2025-06-04
Payer: MEDICARE

## 2025-06-04 VITALS
WEIGHT: 137.38 LBS | DIASTOLIC BLOOD PRESSURE: 75 MMHG | TEMPERATURE: 98 F | HEART RATE: 86 BPM | BODY MASS INDEX: 24.33 KG/M2 | SYSTOLIC BLOOD PRESSURE: 110 MMHG

## 2025-06-04 DIAGNOSIS — M85.80 OSTEOPENIA, UNSPECIFIED LOCATION: ICD-10-CM

## 2025-06-04 DIAGNOSIS — L23.3 ALLERGIC CONTACT DERMATITIS DUE TO DRUGS IN CONTACT WITH SKIN: ICD-10-CM

## 2025-06-04 DIAGNOSIS — L50.3 DERMATOGRAPHIC URTICARIA: Primary | ICD-10-CM

## 2025-06-04 DIAGNOSIS — Z88.9 MULTIPLE DRUG ALLERGIES: ICD-10-CM

## 2025-06-04 DIAGNOSIS — H93.A9 PULSATILE TINNITUS: ICD-10-CM

## 2025-06-04 DIAGNOSIS — L85.3 DRY SKIN DERMATITIS: ICD-10-CM

## 2025-06-04 PROCEDURE — 99999 PR PBB SHADOW E&M-EST. PATIENT-LVL IV: CPT | Mod: PBBFAC,,, | Performed by: SPECIALIST

## 2025-06-04 PROCEDURE — 99214 OFFICE O/P EST MOD 30 MIN: CPT | Mod: PBBFAC | Performed by: SPECIALIST

## 2025-06-04 PROCEDURE — 99215 OFFICE O/P EST HI 40 MIN: CPT | Mod: S$PBB,,, | Performed by: SPECIALIST
